# Patient Record
Sex: MALE | Race: WHITE | NOT HISPANIC OR LATINO | Employment: FULL TIME | URBAN - METROPOLITAN AREA
[De-identification: names, ages, dates, MRNs, and addresses within clinical notes are randomized per-mention and may not be internally consistent; named-entity substitution may affect disease eponyms.]

---

## 2017-04-13 ENCOUNTER — GENERIC CONVERSION - ENCOUNTER (OUTPATIENT)
Dept: OTHER | Facility: OTHER | Age: 62
End: 2017-04-13

## 2017-04-13 ENCOUNTER — ALLSCRIPTS OFFICE VISIT (OUTPATIENT)
Dept: OTHER | Facility: OTHER | Age: 62
End: 2017-04-13

## 2017-04-14 LAB
A/G RATIO (HISTORICAL): 1.9 (ref 1.2–2.2)
ALBUMIN SERPL BCP-MCNC: 4.6 G/DL (ref 3.6–4.8)
ALP SERPL-CCNC: 116 IU/L (ref 39–117)
ALT SERPL W P-5'-P-CCNC: 56 IU/L (ref 0–44)
AMYLASE (HISTORICAL): 96 U/L (ref 31–124)
AST SERPL W P-5'-P-CCNC: 41 IU/L (ref 0–40)
BACTERIA UR QL AUTO: NORMAL
BASOPHILS # BLD AUTO: 0 %
BASOPHILS # BLD AUTO: 0 X10E3/UL (ref 0–0.2)
BILIRUB SERPL-MCNC: 0.6 MG/DL (ref 0–1.2)
BILIRUB UR QL STRIP: NEGATIVE
BUN SERPL-MCNC: 16 MG/DL (ref 8–27)
BUN/CREA RATIO (HISTORICAL): 22 (ref 10–24)
CALCIUM SERPL-MCNC: 9.8 MG/DL (ref 8.6–10.2)
CHLORIDE SERPL-SCNC: 99 MMOL/L (ref 96–106)
CO2 SERPL-SCNC: 24 MMOL/L (ref 18–29)
COLOR UR: YELLOW
COMMENT (HISTORICAL): CLEAR
CREAT SERPL-MCNC: 0.72 MG/DL (ref 0.76–1.27)
DEPRECATED RDW RBC AUTO: 13.1 % (ref 12.3–15.4)
EGFR AFRICAN AMERICAN (HISTORICAL): 116 ML/MIN/1.73
EGFR-AMERICAN CALC (HISTORICAL): 101 ML/MIN/1.73
EOSINOPHIL # BLD AUTO: 0.1 X10E3/UL (ref 0–0.4)
EOSINOPHIL # BLD AUTO: 2 %
ERYTHROCYTE SEDIMENTATION RATE (HISTORICAL): 35 MM/HR (ref 0–30)
FECAL OCCULT BLOOD DIAGNOSTIC (HISTORICAL): NEGATIVE
GGT (HISTORICAL): 118 IU/L (ref 0–65)
GLUCOSE (HISTORICAL): NEGATIVE
GLUCOSE SERPL-MCNC: 98 MG/DL (ref 65–99)
HCT VFR BLD AUTO: 37.7 % (ref 37.5–51)
HGB BLD-MCNC: 12.9 G/DL (ref 12.6–17.7)
IMM.GRANULOCYTES (CD4/8) (HISTORICAL): 0 %
IMM.GRANULOCYTES (CD4/8) (HISTORICAL): 0 X10E3/UL (ref 0–0.1)
KETONES UR STRIP-MCNC: NEGATIVE MG/DL
LEUKOCYTE ESTERASE UR QL STRIP: NEGATIVE
LIPASE SERPL-CCNC: 55 U/L (ref 0–59)
LYME IGG/IGM AB (HISTORICAL): <0.91 ISR (ref 0–0.9)
LYME IGM (HISTORICAL): <0.8 INDEX (ref 0–0.79)
LYMPHOCYTES # BLD AUTO: 2.5 X10E3/UL (ref 0.7–3.1)
LYMPHOCYTES # BLD AUTO: 41 %
MCH RBC QN AUTO: 31.6 PG (ref 26.6–33)
MCHC RBC AUTO-ENTMCNC: 34.2 G/DL (ref 31.5–35.7)
MCV RBC AUTO: 92 FL (ref 79–97)
MICROSCOPIC EXAMINATION (HISTORICAL): NORMAL
MICROSCOPIC EXAMINATION (HISTORICAL): NORMAL
MONOCYTES # BLD AUTO: 0.4 X10E3/UL (ref 0.1–0.9)
MONOCYTES (HISTORICAL): 7 %
MUCUS THREADS (HISTORICAL): PRESENT
NEUTROPHILS # BLD AUTO: 3 X10E3/UL (ref 1.4–7)
NEUTROPHILS # BLD AUTO: 50 %
NITRITE UR QL STRIP: NEGATIVE
NON-SQ EPI CELLS URNS QL MICRO: NORMAL /HPF
PH UR STRIP.AUTO: 6.5 [PH] (ref 5–7.5)
PLATELET # BLD AUTO: 157 X10E3/UL (ref 150–379)
POTASSIUM SERPL-SCNC: 4.4 MMOL/L (ref 3.5–5.2)
PROT UR STRIP-MCNC: NORMAL MG/DL
RBC (HISTORICAL): 4.08 X10E6/UL (ref 4.14–5.8)
RBC (HISTORICAL): NORMAL /HPF
SODIUM SERPL-SCNC: 140 MMOL/L (ref 134–144)
SP GR UR STRIP.AUTO: 1.02 (ref 1–1.03)
TOT. GLOBULIN, SERUM (HISTORICAL): 2.4 G/DL (ref 1.5–4.5)
TOTAL PROTEIN (HISTORICAL): 7 G/DL (ref 6–8.5)
URINALYSIS (UA) (HISTORICAL): NORMAL
UROBILINOGEN UR QL STRIP.AUTO: 1 EU/DL (ref 0.2–1)
WBC # BLD AUTO: 6.1 X10E3/UL (ref 3.4–10.8)
WBC # BLD AUTO: NORMAL /HPF

## 2017-04-17 ENCOUNTER — GENERIC CONVERSION - ENCOUNTER (OUTPATIENT)
Dept: OTHER | Facility: OTHER | Age: 62
End: 2017-04-17

## 2017-04-27 ENCOUNTER — TRANSCRIBE ORDERS (OUTPATIENT)
Dept: ADMINISTRATIVE | Facility: HOSPITAL | Age: 62
End: 2017-04-27

## 2017-04-27 ENCOUNTER — GENERIC CONVERSION - ENCOUNTER (OUTPATIENT)
Dept: OTHER | Facility: OTHER | Age: 62
End: 2017-04-27

## 2017-04-27 DIAGNOSIS — R52 PAIN: Primary | ICD-10-CM

## 2017-04-28 ENCOUNTER — HOSPITAL ENCOUNTER (OUTPATIENT)
Dept: RADIOLOGY | Age: 62
Discharge: HOME | End: 2017-04-28
Payer: COMMERCIAL

## 2017-04-28 DIAGNOSIS — R52 PAIN: ICD-10-CM

## 2017-04-28 PROCEDURE — 76700 US EXAM ABDOM COMPLETE: CPT

## 2018-01-12 NOTE — MISCELLANEOUS
Message   Recorded as Task   Date: 12/21/2016 09:08 AM, Created By: Heber Conrad   Task Name: Call Back   Assigned To: Farida Scott   Regarding Patient: Alona Muñiz, Status: In Progress   Comment:    Heber Conrad - 21 Dec 2016 9:08 AM     TASK CREATED  His xray is normal   please notify   Farida Scott - 21 Dec 2016 1:27 PM     TASK EDITED  Left message to call office  trb   Farida Scott - 21 Dec 2016 1:27 PM     TASK IN PROGRESS   12/21/2016 Patient notified  trb      Active Problems    1  Acute bronchitis (466 0) (J20 9)   2  Chest pain (786 50) (R07 9)   3  Colonoscopy (Fiberoptic) Screening   4  Encounter for screening colonoscopy (V76 51) (Z12 11)   5  Encounter for screening for cardiovascular disorders (V81 2) (Z13 6)   6  Finger fracture (816 00) (S62 609A)   7  Hand injury (959 4) (S69 90XA)   8  Imaging Studies Nonspecific Abnormal Findings Intrathoracic (793 2)   9  Neck pain (723 1) (M54 2)   10  Rib pain (786 50) (R07 81)   11  Rotator Cuff Tendon Tear (726 19)   12  Shoulder Injury (959 2)   13  Special screening examination for neoplasm of prostate (V76 44) (Z12 5)   14  Work-related Environmental Exposure (V62 1)    Current Meds   1  No Reported Medications Recorded    Allergies    1  Codeine Derivatives   2  Morphine Derivatives   3  Penicillins    4  Nuts    Signatures   Electronically signed by :  Ramandeep Sheets, ; Dec 21 2016  5:26PM EST                       (Author)

## 2018-01-12 NOTE — MISCELLANEOUS
Message  patient notified of labs   going to see GI      Signatures   Electronically signed by : Niki Sanchez DO;  Apr 17 2017 10:22AM EST                       (Author)

## 2018-01-13 VITALS
BODY MASS INDEX: 31.83 KG/M2 | SYSTOLIC BLOOD PRESSURE: 150 MMHG | HEART RATE: 73 BPM | HEIGHT: 72 IN | RESPIRATION RATE: 10 BRPM | TEMPERATURE: 97.9 F | WEIGHT: 235 LBS | DIASTOLIC BLOOD PRESSURE: 90 MMHG

## 2018-01-17 ENCOUNTER — ALLSCRIPTS OFFICE VISIT (OUTPATIENT)
Dept: OTHER | Facility: OTHER | Age: 63
End: 2018-01-17

## 2018-01-18 NOTE — PROGRESS NOTES
Assessment   1  Acute sinusitis (461 9) (J01 90)    Plan   Acute sinusitis    · Clarithromycin 500 MG Oral Tablet; TAKE 1 TABLET EVERY 12 HOURS DAILY   · PredniSONE 10 MG Oral Tablet; 6 tabs on day 1, 5 tabs on day 2, 4 tabs on day    3, 3 tabs on day 4    2 tabs on day 5, 1 tab on day 6    Discussion/Summary      Biaxin and prednisone as ordered as needed  Chief Complaint   Fatigue, cough with yellow green mucous, and head congested for 3 weeks      History of Present Illness   HPI: here for 3 weeks of sinus congestion  cough worse  fevers and chills weeks ago that resolved been working in dirty environments      Review of Systems        Constitutional: no fever or chills, feels well, no tiredness, no recent weight loss or weight gain  ENT: sore throat-- and-- nasal discharge, but-- as noted in HPI  Cardiovascular: no complaints of slow or fast heart rate, no chest pain, no palpitations, no leg claudication or lower extremity edema  Respiratory: cough, but-- as noted in HPI  Gastrointestinal: no complaints of abdominal pain, no constipation, no nausea or vomiting, no diarrhea or bloody stools  Genitourinary: no complaints of dysuria or incontinence, no hesitancy, no nocturia, no genital lesion, no inadequacy of penile erection  Musculoskeletal: no complaints of arthralgia, no myalgia, no joint swelling or stiffness, no limb pain or swelling  Integumentary: no complaints of skin rash or lesion, no itching or dry skin, no skin wounds  Neurological: no complaints of headache, no confusion, no numbness or tingling, no dizziness or fainting  Active Problems   1  Chest pain (786 50) (R07 9)   2  Imaging Studies Nonspecific Abnormal Findings Intrathoracic (793 2)   3  Neck pain (723 1) (M54 2)   4  Rib pain (786 50) (R07 81)   5  Rotator Cuff Tendon Tear (726 19)   6  Splenomegaly (789 2) (R16 1)   7  Work-related Environmental Exposure (V62 1)    Past Medical History   1  History of Encounter for screening for cardiovascular disorders (V81 2) (Z13 6)   2  History of Hand injury (959 4) (S69 90XA)   3  History of abdominal pain (V13 89) (Z87 898)   4  History of acute bronchitis (V12 69) (Z87 09)   5  History of fracture of phalanx of finger (V15 51) (Z87 81)   6  History of vomiting (V13 89) (Z87 898)   7  History of Pneumonia (V12 61)   8  History of Shoulder Injury (959 2)  Active Problems And Past Medical History Reviewed: The active problems and past medical history were reviewed and updated today  Family History   Mother    1  Family history of Carcinoma Of The Pancreas  Father    2  Family history of Pacemaker Placement  Family History Reviewed: The family history was reviewed and updated today  Social History    · Former smoker (W06 06) (F48 533)  The social history was reviewed and updated today  The social history was reviewed and is unchanged  Surgical History   Surgical History Reviewed: The surgical history was reviewed and updated today  Current Meds    1  No Reported Medications Recorded     The medication list was reviewed and updated today  Allergies   1  Codeine Derivatives   2  Morphine Derivatives   3  Penicillins  4  Nuts    Vitals    Recorded: 05XXC5810 09:45AM   Temperature 97 5 F, Tympanic   Heart Rate 76   Systolic 387, RUE, Sitting   Diastolic 067, RUE, Sitting   Height 6 ft    Weight 209 lb    BMI Calculated 28 35   BSA Calculated 2 17     Physical Exam        Constitutional      General appearance: No acute distress, well appearing and well nourished  Ears, Nose, Mouth, and Throat      Otoscopic examination: Abnormal  -- bulging tm's  Nasal mucosa, septum, and turbinates: Abnormal  -- +Swollen bloody turbinates  Oropharynx: Abnormal  -- +PND  Pulmonary      Respiratory effort: No increased work of breathing or signs of respiratory distress         Auscultation of lungs: Clear to auscultation, equal breath sounds bilaterally, no wheezes, no rales, no rhonci  Cardiovascular      Auscultation of heart: Normal rate and rhythm, normal S1 and S2, without murmurs  Carotid pulses: Normal        Abdomen      Abdomen: Non-tender, no masses  Liver and spleen: No hepatomegaly or splenomegaly  Lymphatic      Palpation of lymph nodes in neck: No lymphadenopathy  Musculoskeletal      Gait and station: Normal        Digits and nails: Normal without clubbing or cyanosis  Inspection/palpation of joints, bones, and muscles: Normal        Skin      Skin and subcutaneous tissue: Normal without rashes or lesions  Neurologic      Cranial nerves: Cranial nerves 2-12 intact  Sensation: No sensory loss         Psychiatric      Orientation to person, place and time: Normal        Mood and affect: Normal           Signatures    Electronically signed by : Gregoria Locke DO; Jan 17 2018 10:27AM EST                       (Author)

## 2018-01-23 VITALS
SYSTOLIC BLOOD PRESSURE: 150 MMHG | HEART RATE: 76 BPM | TEMPERATURE: 97.5 F | WEIGHT: 209 LBS | HEIGHT: 72 IN | BODY MASS INDEX: 28.31 KG/M2 | DIASTOLIC BLOOD PRESSURE: 100 MMHG

## 2018-01-23 NOTE — MISCELLANEOUS
Message  Return to work or school:   Chip Parra is under my professional care   He was seen in my office on 01/17/2018   He is able to return to work on  01/22/2018            Signatures   Electronically signed by : Radha Sanchez DO; Jan 17 2018 10:34AM EST                       (Author)

## 2018-02-23 ENCOUNTER — HOSPITAL ENCOUNTER (OUTPATIENT)
Dept: ULTRASOUND IMAGING | Facility: HOSPITAL | Age: 63
Discharge: HOME | End: 2018-02-23
Payer: OTHER MISCELLANEOUS

## 2018-02-23 ENCOUNTER — OFFICE VISIT (OUTPATIENT)
Dept: FAMILY MEDICINE CLINIC | Facility: CLINIC | Age: 63
End: 2018-02-23
Payer: COMMERCIAL

## 2018-02-23 VITALS
DIASTOLIC BLOOD PRESSURE: 68 MMHG | HEART RATE: 70 BPM | SYSTOLIC BLOOD PRESSURE: 120 MMHG | TEMPERATURE: 97.8 F | BODY MASS INDEX: 30.2 KG/M2 | RESPIRATION RATE: 14 BRPM | HEIGHT: 72 IN | WEIGHT: 223 LBS

## 2018-02-23 DIAGNOSIS — N50.82 SCROTUM PAIN: Primary | ICD-10-CM

## 2018-02-23 DIAGNOSIS — N50.82 SCROTUM PAIN: ICD-10-CM

## 2018-02-23 PROCEDURE — 76870 US EXAM SCROTUM: CPT

## 2018-02-23 PROCEDURE — 99213 OFFICE O/P EST LOW 20 MIN: CPT | Performed by: FAMILY MEDICINE

## 2018-02-23 NOTE — PROGRESS NOTES
Assessment/Plan:         Diagnoses and all orders for this visit:    Scrotum pain  -     US scrotum and testicles --stat  Will go at 2:15 pm today      will f/u after u/s        Subjective:     Chief Complaint   Patient presents with    Work Related Injury     injury occurred at work on Wednesday 2/21/18         Patient ID: Katrina Barnes is a 58 y o  male  Was pushing a skid and felt pain down legs   now scrotum is swollen and huge  And painful        The following portions of the patient's history were reviewed and updated as appropriate: allergies, current medications, past family history, past medical history, past social history, past surgical history and problem list     Review of Systems   Constitutional: Negative  HENT: Negative  Eyes: Negative  Respiratory: Negative  Cardiovascular: Negative  Gastrointestinal: Negative  Endocrine: Negative  Genitourinary: Negative  Musculoskeletal: Negative  Skin: Negative  Allergic/Immunologic: Negative  Neurological: Negative  Hematological: Negative  Psychiatric/Behavioral: Negative  All other systems reviewed and are negative  Objective:    Vitals:    02/23/18 1219   BP: 120/68   BP Location: Left arm   Patient Position: Sitting   Cuff Size: Standard   Pulse: 70   Resp: 14   Temp: 97 8 °F (36 6 °C)   TempSrc: Tympanic   Weight: 101 kg (223 lb)   Height: 6' (1 829 m)          Physical Exam   Constitutional: He is oriented to person, place, and time  He appears well-developed and well-nourished  HENT:   Head: Normocephalic  Right Ear: External ear normal    Left Ear: External ear normal    Nose: Nose normal    Mouth/Throat: Oropharynx is clear and moist    Eyes: EOM are normal  Pupils are equal, round, and reactive to light  Neck: Normal range of motion  Cardiovascular: Normal rate, regular rhythm and normal heart sounds  Pulmonary/Chest: Effort normal and breath sounds normal    Abdominal: Soft   Bowel sounds are normal  He exhibits no distension  There is no tenderness  Genitourinary:   Genitourinary Comments: Pain and swelling in scrotum b/l     Musculoskeletal: Normal range of motion  Neurological: He is alert and oriented to person, place, and time  No cranial nerve deficit  Skin: Skin is warm and dry  No rash noted  Psychiatric: He has a normal mood and affect  His behavior is normal  Judgment and thought content normal    Nursing note and vitals reviewed

## 2018-03-16 ENCOUNTER — HOSPITAL ENCOUNTER (OUTPATIENT)
Dept: CT IMAGING | Facility: HOSPITAL | Age: 63
Discharge: HOME | End: 2018-03-16
Payer: COMMERCIAL

## 2018-03-16 ENCOUNTER — OFFICE VISIT (OUTPATIENT)
Dept: FAMILY MEDICINE CLINIC | Facility: CLINIC | Age: 63
End: 2018-03-16
Payer: COMMERCIAL

## 2018-03-16 ENCOUNTER — APPOINTMENT (OUTPATIENT)
Dept: LAB | Facility: HOSPITAL | Age: 63
End: 2018-03-16
Payer: OTHER MISCELLANEOUS

## 2018-03-16 VITALS
SYSTOLIC BLOOD PRESSURE: 118 MMHG | RESPIRATION RATE: 14 BRPM | HEART RATE: 65 BPM | DIASTOLIC BLOOD PRESSURE: 80 MMHG | TEMPERATURE: 97.8 F | BODY MASS INDEX: 29.8 KG/M2 | HEIGHT: 72 IN | WEIGHT: 220 LBS

## 2018-03-16 DIAGNOSIS — S39.91XD INJURY OF GROIN, SUBSEQUENT ENCOUNTER: ICD-10-CM

## 2018-03-16 DIAGNOSIS — R60.9 EDEMA, UNSPECIFIED TYPE: ICD-10-CM

## 2018-03-16 DIAGNOSIS — C22.1 CHOLANGIOCARCINOMA METASTATIC TO LIVER (HCC): ICD-10-CM

## 2018-03-16 DIAGNOSIS — R17 JAUNDICE: ICD-10-CM

## 2018-03-16 DIAGNOSIS — S39.91XD INJURY OF GROIN, SUBSEQUENT ENCOUNTER: Primary | ICD-10-CM

## 2018-03-16 DIAGNOSIS — C78.7 CHOLANGIOCARCINOMA METASTATIC TO LIVER (HCC): ICD-10-CM

## 2018-03-16 LAB
ALBUMIN SERPL BCP-MCNC: 3.3 G/DL (ref 3.5–5)
ALP SERPL-CCNC: 703 U/L (ref 46–116)
ALT SERPL W P-5'-P-CCNC: 85 U/L (ref 12–78)
ANION GAP SERPL CALCULATED.3IONS-SCNC: 8 MMOL/L (ref 4–13)
AST SERPL W P-5'-P-CCNC: 199 U/L (ref 5–45)
BILIRUB SERPL-MCNC: 2.7 MG/DL (ref 0.2–1)
BUN SERPL-MCNC: 19 MG/DL (ref 5–25)
CALCIUM SERPL-MCNC: 8.7 MG/DL (ref 8.3–10.1)
CHLORIDE SERPL-SCNC: 102 MMOL/L (ref 100–108)
CO2 SERPL-SCNC: 28 MMOL/L (ref 21–32)
CREAT SERPL-MCNC: 0.78 MG/DL (ref 0.6–1.3)
GFR SERPL CREATININE-BSD FRML MDRD: 97 ML/MIN/1.73SQ M
GLUCOSE P FAST SERPL-MCNC: 101 MG/DL (ref 65–99)
HM COLONOSCOPY: NORMAL
POTASSIUM SERPL-SCNC: 3.8 MMOL/L (ref 3.5–5.3)
PROT SERPL-MCNC: 7 G/DL (ref 6.4–8.2)
SODIUM SERPL-SCNC: 138 MMOL/L (ref 136–145)

## 2018-03-16 PROCEDURE — 80053 COMPREHEN METABOLIC PANEL: CPT

## 2018-03-16 PROCEDURE — 74177 CT ABD & PELVIS W/CONTRAST: CPT

## 2018-03-16 PROCEDURE — 99213 OFFICE O/P EST LOW 20 MIN: CPT | Performed by: FAMILY MEDICINE

## 2018-03-16 PROCEDURE — 36415 COLL VENOUS BLD VENIPUNCTURE: CPT

## 2018-03-16 RX ADMIN — IOHEXOL 100 ML: 350 INJECTION, SOLUTION INTRAVENOUS at 11:56

## 2018-03-16 NOTE — PROGRESS NOTES
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)    Jaundice  -     Comprehensive metabolic panel; Future  -     CT abdomen pelvis w contrast; Future    Edema, unspecified type  -     CT abdomen pelvis w contrast; Future        Upon initial re-evaluation for groin injury from 3 weeks ago-- patient was found to have significant edema that is not explained by his injury  On further evaluation, he was found to have ascites and jaundice  He was sent for stat ct scan and found to have extensive cholangiocarcinoma   with mets and thrombus thoughout his abdomen  He returned after his ct scan and we discussed the results  At length  He does not currently have active insurance  He will need to se oncology as soon as possible  And will need to get medical assistence   He was instructed to how to go about this  He is instructed to call our office for any help or guidance he may need  Subjective:     Chief Complaint   Patient presents with    Leg Pain     leg and feet swelling and pain begining 3 weeks ago  Patient ID: Mohamud Monreal is a 58 y o  male  Here for f/u of groin injury  Pain has not gotten better  Also now legs have started swelling  The following portions of the patient's history were reviewed and updated as appropriate: allergies, current medications, past family history, past medical history, past social history, past surgical history and problem list     Review of Systems   Constitutional: Negative  HENT: Negative  Eyes: Negative  Respiratory: Negative  Cardiovascular: Negative  Gastrointestinal: Positive for abdominal pain  Endocrine: Negative  Genitourinary:        Groin pain   Musculoskeletal: Negative  Skin: Negative  Allergic/Immunologic: Negative  Neurological: Negative  Hematological: Negative  Psychiatric/Behavioral: Negative  All other systems reviewed and are negative  Objective:    Vitals:    03/16/18 0846   BP: 118/80   BP Location: Left arm   Patient Position: Sitting   Cuff Size: Large   Pulse: 65   Resp: 14   Temp: 97 8 °F (36 6 °C)   TempSrc: Tympanic   Weight: 99 8 kg (220 lb)   Height: 6' (1 829 m)          Physical Exam   Constitutional: He is oriented to person, place, and time  He appears well-developed and well-nourished  No distress  HENT:   Head: Normocephalic  Right Ear: External ear normal    Left Ear: External ear normal    Nose: Nose normal    Mouth/Throat: Oropharynx is clear and moist    Eyes: Conjunctivae and EOM are normal  Pupils are equal, round, and reactive to light  Right eye exhibits no discharge  Left eye exhibits no discharge  Neck: Normal range of motion  Cardiovascular: Normal rate, regular rhythm and normal heart sounds  Pulmonary/Chest: Effort normal and breath sounds normal    Abdominal: Soft  Bowel sounds are normal  He exhibits distension  There is no tenderness  Ascites     Musculoskeletal: Normal range of motion  He exhibits edema  B/l + 4 edema     Neurological: He is alert and oriented to person, place, and time  He displays abnormal reflex  No cranial nerve deficit  Skin: Skin is warm and dry  No rash noted  +jaundice   Psychiatric: He has a normal mood and affect  His behavior is normal  Judgment and thought content normal    Nursing note and vitals reviewed

## 2018-03-17 ENCOUNTER — TELEPHONE (OUTPATIENT)
Dept: FAMILY MEDICINE CLINIC | Facility: CLINIC | Age: 63
End: 2018-03-17

## 2018-03-19 DIAGNOSIS — C22.1 CHOLANGIOCARCINOMA METASTATIC TO LIVER (HCC): Primary | ICD-10-CM

## 2018-03-19 DIAGNOSIS — C78.7 CHOLANGIOCARCINOMA METASTATIC TO LIVER (HCC): Primary | ICD-10-CM

## 2018-03-29 ENCOUNTER — TRANSCRIBE ORDERS (OUTPATIENT)
Dept: ADMINISTRATIVE | Facility: HOSPITAL | Age: 63
End: 2018-03-29

## 2018-03-29 DIAGNOSIS — R16.0 HEPATOMEGALY: Primary | ICD-10-CM

## 2018-04-03 ENCOUNTER — HOSPITAL ENCOUNTER (OUTPATIENT)
Dept: RADIOLOGY | Age: 63
Discharge: HOME | End: 2018-04-03
Payer: COMMERCIAL

## 2018-04-03 VITALS — WEIGHT: 229 LBS | BODY MASS INDEX: 31.06 KG/M2

## 2018-04-03 DIAGNOSIS — R16.0 LIVER MASSES: ICD-10-CM

## 2018-04-03 LAB — GLUCOSE SERPL-MCNC: 89 MG/DL (ref 65–140)

## 2018-04-03 PROCEDURE — A9552 F18 FDG: HCPCS

## 2018-04-03 PROCEDURE — 82948 REAGENT STRIP/BLOOD GLUCOSE: CPT

## 2018-04-03 PROCEDURE — 78815 PET IMAGE W/CT SKULL-THIGH: CPT

## 2018-04-03 RX ADMIN — IOHEXOL 5 ML: 240 INJECTION, SOLUTION INTRATHECAL; INTRAVASCULAR; INTRAVENOUS; ORAL at 11:54

## 2018-05-04 ENCOUNTER — HOSPITAL ENCOUNTER (EMERGENCY)
Facility: HOSPITAL | Age: 63
Discharge: HOME | End: 2018-05-04
Attending: EMERGENCY MEDICINE | Admitting: EMERGENCY MEDICINE
Payer: COMMERCIAL

## 2018-05-04 VITALS
OXYGEN SATURATION: 98 % | HEIGHT: 72 IN | SYSTOLIC BLOOD PRESSURE: 117 MMHG | WEIGHT: 229.28 LBS | HEART RATE: 98 BPM | RESPIRATION RATE: 17 BRPM | BODY MASS INDEX: 31.05 KG/M2 | TEMPERATURE: 97.5 F | DIASTOLIC BLOOD PRESSURE: 62 MMHG

## 2018-05-04 DIAGNOSIS — R18.8 ASCITES: Primary | ICD-10-CM

## 2018-05-04 PROCEDURE — 99283 EMERGENCY DEPT VISIT LOW MDM: CPT

## 2018-05-04 NOTE — ED PROVIDER NOTES
History  Chief Complaint   Patient presents with    Abdominal Problem     patient presents to ED  States he has bile duct CA and had some fluid drained from his abdomen last week  Yesterday recieved a double dose of chemo and now the puncture wound is leaking fluid  Spoke to their doctor at [facility] and was told to come here to get the puncture wound glued     Patient has bile duct cancer, had paracentesis 1 week ago, then had IVF yesterday with chemo  Today, woke up with saturated clear fluid at paracentesis site, no fevers, no abd pain  Dr Sonu Peralta referred him to have dermabond placed at puncture site  None       History reviewed  No pertinent past medical history  Past Surgical History:   Procedure Laterality Date    CHOLECYSTECTOMY      JOINT REPLACEMENT         History reviewed  No pertinent family history  I have reviewed and agree with the history as documented  Social History   Substance Use Topics    Smoking status: Former Smoker     Types: Cigarettes    Smokeless tobacco: Never Used      Comment: quit over 30 years ago    Alcohol use Yes      Comment: socially        Review of Systems   All other systems reviewed and are negative  Physical Exam  ED Triage Vitals [05/04/18 1344]   Temperature Pulse Respirations Blood Pressure SpO2   97 5 °F (36 4 °C) 98 17 117/62 98 %      Temp Source Heart Rate Source Patient Position - Orthostatic VS BP Location FiO2 (%)   Temporal Monitor Sitting Right arm --      Pain Score       No Pain           Orthostatic Vital Signs  Vitals:    05/04/18 1344   BP: 117/62   Pulse: 98   Patient Position - Orthostatic VS: Sitting       Physical Exam   Constitutional: He appears cachectic  He has a sickly appearance  HENT:   Mouth/Throat: Oropharynx is clear and moist    Eyes: Conjunctivae and EOM are normal  Pupils are equal, round, and reactive to light  Neck: Normal range of motion  Neck supple  No spinous process tenderness present  Cardiovascular: Normal rate, regular rhythm, normal heart sounds and intact distal pulses  Pulmonary/Chest: Effort normal and breath sounds normal  No respiratory distress  He has no wheezes  Abdominal: Soft  Bowel sounds are normal  He exhibits distension and ascites  There is no tenderness  Musculoskeletal: Normal range of motion  He exhibits edema  Neurological: He is alert  He has normal strength  No sensory deficit  GCS eye subscore is 4  GCS verbal subscore is 5  GCS motor subscore is 6  Skin: Skin is warm and dry  No rash noted  Psychiatric: He has a normal mood and affect  Nursing note and vitals reviewed  ED Medications  Medications - No data to display    Diagnostic Studies  Results Reviewed     None                 No orders to display              Procedures  General Procedure  Date/Time: 5/4/2018 2:05 PM  Performed by: Jose Canas by: Precious Padilla     Patient location:  ED  Consent:     Consent obtained:  Verbal    Consent given by:  Patient    Risks discussed:  Infection    Alternatives discussed:  No treatment  Pre-procedure details:     Procedure prep: alcohol  Anesthesia (see MAR for exact dosages): Anesthesia method:  None  Procedure Detail:     Equipment used:  Dermabond and 4 by 4 gauze    Procedure note (site, laterality, method, findings):  Patient on left lateral decubitus  Post-procedure details:     Patient tolerance of procedure: Tolerated well, no immediate complications           Phone Contacts  ED Phone Contact    ED Course  ED Course as of May 04 2006   Fri May 04, 2018   1355 Left message with Dr Maye Cisneros to call back      Dr Maye Cisneros recommends apply dermabond until patient gets his next paracentesis                            MDM  Number of Diagnoses or Management Options  Ascites: established and worsening     Amount and/or Complexity of Data Reviewed  Discuss the patient with other providers: yes    Patient Progress  Patient progress: improved    CritCare Time    Disposition  Final diagnoses:   Ascites - drainage at paracentesis site, acute     Time reflects when diagnosis was documented in both MDM as applicable and the Disposition within this note     Time User Action Codes Description Comment    5/4/2018  2:54 PM Suellen Maldonado Add [R18 8] Ascites     5/4/2018  2:54 PM Suellen Maldonado Modify [R18 8] Ascites drainage at paracentesis site, acute      ED Disposition     ED Disposition Condition Comment    Discharge  Theador Mis discharge to home/self care  Condition at discharge: Good        Follow-up Information     Follow up With Specialties Details Why Laya Mims MD  In 3 days  [facility] [id number]  [phone]          There are no discharge medications for this patient  No discharge procedures on file      ED Provider  Electronically Signed by           Claudia Sky DO  05/04/18 2007

## 2018-05-04 NOTE — DISCHARGE INSTRUCTIONS
Ascites   WHAT YOU NEED TO KNOW:   What is ascites? Ascites is excess fluid in your lower abdomen  The fluid causes swelling  Ascites can signal a more serious problem in your body  What causes ascites? · Liver disease, such as cirrhosis or hepatitis     · Cancer     · Congestive heart failure     · Blood clots in the veins that enter and leave the liver  What are the signs and symptoms of ascites? · Rapid weight gain and swelling     · Swollen abdomen     · Shortness of breath     · Stretch marks and bulging veins on the abdomen     · Nausea     · A feeling of fullness after eating little food  How is ascites diagnosed? Your healthcare provider will ask you to lie on your back while he taps or presses on your abdomen  He will ask about your symptoms and when they started  He will ask about alcohol or IV (intravenous) drug use, sexual activity, and any blood transfusions you have had  Tell him if you have had heart problems or cancer  · Blood and urine tests:  Healthcare providers will test samples of your blood and urine to see how your liver and kidneys are working  · Abdominal ultrasound:  Gel helps your healthcare provider move a sensor over your abdomen during an ultrasound  The sensor uses sound waves to show pictures of your organs on a monitor  · CT scan:  A CT scan uses a computer to take x-rays of the organs and blood vessels in your abdomen  You may be given dye called contrast to help the pictures show up better  Tell the healthcare provider if you are allergic to iodine or seafood  You may also be allergic to the dye  · Ascites fluid test:  Healthcare providers use a needle to take a sample of fluid from your abdomen for testing  This procedure is called a fluid tap or paracentesis  Numbing medicine makes you more comfortable during this test  Tests on the fluid help find the cause of your ascites and screen for infection       · 24-hour urine collection:  You will use a container to hold all of your urine collected over 24 hours  The urine must be kept cold until it is tested  · Other tests:  Tests such as endoscopy or laparoscopy use a scope (tube) with a camera on its tip to see your lower esophagus or liver  Anesthesia medicine will keep you numb or asleep during the procedure  Healthcare providers will look for bleeding or blood clots  A tissue sample (biopsy) may be taken  How is ascites treated? Ascites treatment usually combines medicines with changes to your eating and drinking habits  Other treatments are used if your ascites does not improve or if the condition that caused the ascites is getting worse  · Medicines:      ¨ Diuretics:  Diuretics help decrease the fluid in your abdomen by causing you to urinate more often  You will lose weight as the fluid decreases  Ask your healthcare provider how much weight you should expect to lose each day  ¨ Antibiotics: These medicines are used to prevent or fight infections caused by bacteria  ¨ Vaccines or antiviral medicines:  When ascites is caused by hepatitis (a virus that attacks your liver), antiviral medicines may help prevent more damage to your liver  Healthcare providers may give you vaccines to prevent hepatitis  · Sodium and liquid restriction:      ¨ Sodium:  You must lower the amount of sodium (salt) you eat and drink to reduce the fluid in your abdomen  Sodium is in many foods, even when you cannot taste it  You may need to be on a 2-gram sodium diet, which limits your sodium to less than half a teaspoon each day  Ask your healthcare provider for more information about low-sodium diets  ¨ Liquid:  Healthcare providers may ask you to limit your liquids to about 34 ounces (about 2 pints) a day if you are not losing fluid with a low-sodium diet  · Procedures and surgeries:      ¨ Paracentesis:  Your healthcare provider drains the fluid out of your abdomen through a needle   You will receive numbing medicine before the procedure  Paracentesis can quickly remove quarts of fluid  Paracentesis may be repeated if the ascites does not respond to other treatments  ¨ Transjugular intrahepatic portosystemic shunt: This procedure is also called TIPS  TIPS can treat large ascites when you cannot have paracentesis  Your healthcare provider uses a catheter (plastic tube) to increase blood flow through your liver  This helps to reduce the fluid in your abdomen  ¨ Liver transplant:  Sometimes your liver is so damaged that a liver transplant is the only long-term treatment option  ¨ Peritoneovenous shunt: This procedure drains the fluid into a large vein to be absorbed by the body  The shunt is a tube placed in your abdomen and connected to the vein  Healthcare providers may use this procedure if you cannot have a liver transplant or other ascites treatments  What are the risks of ascites? · The excess fluid may affect your ability to breathe  Your swollen abdomen can make it hard to eat  Hernias (weak areas in the muscles on your abdomen) may form from the pressure of the fluid  Treatment can change your electrolyte (body chemical) balance  Electrolyte changes can cause confusion, drowsiness, and thinking and movement problems that may lead to coma  You may get an infection called spontaneous bacterial peritonitis in your abdomen that can be life-threatening  You will need more medicines or treatments if you develop these problems  · Even with treatment, the condition that caused your ascites may become life-threatening  Bleeding in the esophagus or liver or kidney failure can occur  How do I manage ascites? · Do not drink alcohol:  Alcohol worsens the damage to your liver  Your ascites may improve or even go away after you stop drinking  Do not take medicines that contain alcohol  Ask your healthcare provider for information if you need help to quit drinking alcohol       · Follow your low-sodium plan:  A dietitian can help you create a low-sodium diet  He may suggest lemon juice or herbs to flavor your food  Avoid salted butter or margarine, milk, cheese, and canned or frozen foods that are not made for low-salt diets  Ask your healthcare provider or dietitian before you use salt substitutes  · Write down your daily weight: You will need to track your weight at home so healthcare providers can see if treatment is working  Weigh yourself each day  Ask your healthcare provider how much weight you should be losing  Your healthcare provider will want to know if you are losing too much or too little weight  · Ask about NSAIDs:  NSAIDs are over-the-counter pain and fever medicines  You may not urinate enough to take NSAIDs safely  Ask your healthcare provider if NSAIDs are safe for you  Follow directions carefully  These medicines can cause stomach bleeding or kidney problems if they are not taken correctly  · Limit activity: Too much physical activity may make your ascites worse  Ask your healthcare provider if you have any limits to your normal daily activities  Rarely, bedrest is needed if other health problems develop with ascites  Where can I find more information? · [facility]  [address] [facility] , [address]  Phone: [phone]  Web Address: cheerapp  When should I contact my healthcare provider? Contact your healthcare provider if:  · You are losing more or less weight than expected  · You are urinating less than usual      · You feel dizzy or lightheaded  · You develop tiredness, dry mouth, nausea, or vomiting  · You have muscle cramps or twitches  · You have questions or concerns about your condition or care  When should I seek immediate care? Seek care immediately or call 911 if:  · You have a fever  · You feel pain in your abdomen  · You have trouble breathing       · You feel confused, faint, or lose consciousness  · You vomit blood or see blood in your bowel movement  CARE AGREEMENT:   You have the right to help plan your care  Learn about your health condition and how it may be treated  Discuss treatment options with your caregivers to decide what care you want to receive  You always have the right to refuse treatment  The above information is an  only  It is not intended as medical advice for individual conditions or treatments  Talk to your doctor, nurse or pharmacist before following any medical regimen to see if it is safe and effective for you  © 2017 [employer]  Information is for End User's use only and may not be sold, redistributed or otherwise used for commercial purposes  All illustrations and images included in CareNotes® are the copyrighted property of A D A M , Inc  or [employer]  Skin Adhesive Care   WHAT YOU NEED TO KNOW:   Skin adhesive is medical glue used to close wounds  It is a substitute for staples and stitches  Skin adhesive wound closures take less time and do not require anesthesia  You have less pain and a lower risk of infection than with staples or stitches  Skin adhesive will fall off after the wound is healed  DISCHARGE INSTRUCTIONS:   Self-care:   · Keep your wound clean and dry  for 1 to 5 days  You can shower 24 hours after the skin adhesive is applied  Lightly pat your wound dry after you shower  · Do not soak  your wound in water, such as in a bath or hot tub  · Do not scrub  your wound or pick at the adhesive  This can make your wound reopen  · Do not apply ointments  to your wound  These include antibiotic and other ointments that contain petroleum jelly  These products will remove skin adhesive and reopen your wound  Follow up with your healthcare provider as directed:  Write down your questions so you remember to ask them during your visits  Contact your healthcare provider if:   · You have a fever  · Your wound is red and warm to touch  · You have questions or concerns about your condition or care  Seek care immediately if:   · Your wound has fluid draining from it  · Your wound opens  © 2017 [employer] [employer] Information is for End User's use only and may not be sold, redistributed or otherwise used for commercial purposes  All illustrations and images included in CareNotes® are the copyrighted property of A D [employer] M , Inc  or [employer]  The above information is an  only  It is not intended as medical advice for individual conditions or treatments  Talk to your doctor, nurse or pharmacist before following any medical regimen to see if it is safe and effective for you

## 2018-10-16 RX ORDER — FUROSEMIDE 10 MG/ML
20 INJECTION INTRAMUSCULAR; INTRAVENOUS ONCE
Status: DISCONTINUED | OUTPATIENT
Start: 2018-10-17 | End: 2018-10-21 | Stop reason: HOSPADM

## 2018-10-16 RX ORDER — SODIUM CHLORIDE 9 MG/ML
20 INJECTION, SOLUTION INTRAVENOUS CONTINUOUS
Status: DISCONTINUED | OUTPATIENT
Start: 2018-10-17 | End: 2018-10-21 | Stop reason: HOSPADM

## 2018-10-17 ENCOUNTER — HOSPITAL ENCOUNTER (OUTPATIENT)
Dept: INFUSION CENTER | Facility: HOSPITAL | Age: 63
Discharge: HOME | End: 2018-10-17
Payer: COMMERCIAL

## 2018-10-17 VITALS
DIASTOLIC BLOOD PRESSURE: 57 MMHG | SYSTOLIC BLOOD PRESSURE: 100 MMHG | TEMPERATURE: 98.8 F | HEART RATE: 95 BPM | RESPIRATION RATE: 16 BRPM

## 2018-10-17 LAB
ABO GROUP BLD: NORMAL
ABO GROUP BLD: NORMAL
BLD GP AB SCN SERPL QL: NORMAL
RH BLD: POSITIVE
RH BLD: POSITIVE
SPECIMEN EXPIRATION DATE: NORMAL

## 2018-10-17 PROCEDURE — 36430 TRANSFUSION BLD/BLD COMPNT: CPT

## 2018-10-17 PROCEDURE — 86850 RBC ANTIBODY SCREEN: CPT | Performed by: INTERNAL MEDICINE

## 2018-10-17 PROCEDURE — 86900 BLOOD TYPING SEROLOGIC ABO: CPT | Performed by: INTERNAL MEDICINE

## 2018-10-17 PROCEDURE — P9021 RED BLOOD CELLS UNIT: HCPCS

## 2018-10-17 PROCEDURE — 86920 COMPATIBILITY TEST SPIN: CPT

## 2018-10-17 PROCEDURE — 86901 BLOOD TYPING SEROLOGIC RH(D): CPT | Performed by: INTERNAL MEDICINE

## 2018-10-17 RX ORDER — SPIRONOLACTONE 100 MG/1
100 TABLET, FILM COATED ORAL DAILY
COMMUNITY

## 2018-10-17 RX ORDER — FUROSEMIDE 10 MG/ML
INJECTION INTRAMUSCULAR; INTRAVENOUS
Status: DISCONTINUED
Start: 2018-10-17 | End: 2018-10-17 | Stop reason: WASHOUT

## 2018-10-17 RX ORDER — ONDANSETRON HYDROCHLORIDE 8 MG/1
TABLET, FILM COATED ORAL EVERY 8 HOURS PRN
COMMUNITY

## 2018-10-17 RX ORDER — BUMETANIDE 1 MG/1
1 TABLET ORAL DAILY
COMMUNITY

## 2018-10-17 RX ADMIN — SODIUM CHLORIDE 20 ML/HR: 9 INJECTION, SOLUTION INTRAVENOUS at 10:50

## 2018-10-17 NOTE — PROGRESS NOTES
Pt has a right arm dual lumen picc placed at [address] [place] [address]   picc site wnl, Purple port flushes and aspirates freely red port has tape on it "do not use", blood work taken from purple port

## 2018-10-17 NOTE — PROGRESS NOTES
Pt here for 2 units PRBC from [employer], hgb 7 3 last week  Pt asymtomatic at this time   Pt offers no complaints today

## 2018-10-17 NOTE — PROGRESS NOTES
Pt tolerated infusion well  picc flushed per routine and fresh caps applied  Pt refused iv lasix  Reasons for med explained  Pt said "im already taking water pills and its such a struggle to get up, theres no way  Plus I have an hour drive  I dont want it "  pts pulse ox is 97% at present  No cough noted  Pt denies shortness of breath   Awaiting ambulance for transport back to [address] [place] [address]

## 2018-10-24 ENCOUNTER — HOSPITAL ENCOUNTER (OUTPATIENT)
Dept: RADIOLOGY | Facility: HOSPITAL | Age: 63
Discharge: HOME | End: 2018-10-24
Attending: INTERNAL MEDICINE
Payer: COMMERCIAL

## 2018-10-24 VITALS
SYSTOLIC BLOOD PRESSURE: 93 MMHG | HEART RATE: 95 BPM | OXYGEN SATURATION: 95 % | DIASTOLIC BLOOD PRESSURE: 55 MMHG | RESPIRATION RATE: 16 BRPM

## 2018-10-24 DIAGNOSIS — J90 PLEURAL EFFUSION: ICD-10-CM

## 2018-10-24 DIAGNOSIS — R18.8 ASCITES: ICD-10-CM

## 2018-10-24 PROCEDURE — 32555 ASPIRATE PLEURA W/ IMAGING: CPT | Performed by: RADIOLOGY

## 2018-10-24 PROCEDURE — 76705 ECHO EXAM OF ABDOMEN: CPT | Performed by: RADIOLOGY

## 2018-10-24 PROCEDURE — 32555 ASPIRATE PLEURA W/ IMAGING: CPT

## 2018-10-24 NOTE — NURSING NOTE
Patient transported back to [facility] at this time with [facility]  Patient vitals stable upon departure, denies pain  Thoracentesis site dressing remains clean, dry, intact

## 2018-10-24 NOTE — DISCHARGE INSTRUCTIONS
Thoracentesis   WHAT YOU NEED TO KNOW:   A thoracentesis is a procedure to remove extra fluid or air from between your lungs and your inner chest wall  Air or fluid buildup may make it hard for you to breathe  A thoracentesis allows your lungs to expand fully so you can breathe more easily  DISCHARGE INSTRUCTIONS:   Medicines:   · Pain medicine: You may be given a prescription medicine to decrease pain  Do not wait until the pain is severe before you take your medicine  · Antibiotics: This medicine helps fight or prevent an infection  · Take your medicine as directed  Contact your healthcare provider if you think your medicine is not helping or if you have side effects  Tell him or her if you are allergic to any medicine  Keep a list of the medicines, vitamins, and herbs you take  Include the amounts, and when and why you take them  Bring the list or the pill bottles to follow-up visits  Carry your medicine list with you in case of an emergency  Follow up with your healthcare provider as directed:  Write down your questions so you remember to ask them during your visits  Rest:  Rest when you feel it is needed  Slowly start to do more each day  Return to your daily activities as directed  Do not smoke: If you smoke, it is never too late to quit  Ask for information about how to stop smoking if you need help  Contact your healthcare provider if:   · You have a fever  · Your puncture site is red, warm, swollen, or draining pus  · You have questions or concerns about your procedure, medicine, or care  Seek care immediately or call 911 if:   · Blood soaks through your bandage  · There is blood in your spit  · You had a chest tube removed and your stitches come apart  · You have trouble breathing all of a sudden  · You have severe chest pain    © 2017 [employer] Information is for End User's use only and may not be sold, redistributed or otherwise used for commercial purposes  All illustrations and images included in CareNotes® are the copyrighted property of [employer]  or [employer] Haider  The above information is an  only  It is not intended as medical advice for individual conditions or treatments  Talk to your doctor, nurse or pharmacist before following any medical regimen to see if it is safe and effective for you

## 2018-10-24 NOTE — BRIEF OP NOTE (RAD/CATH)
IR PARACENTESIS:  Procedure Note    PATIENT NAME: Litzy Galloway  : 1955  MRN: [id number]     Pre-op Diagnosis:   1  Ascites      Post-op Diagnosis:   1  Ascites        Surgeon:   Niru Cline MD  Assistants:     Estimated Blood Loss:  No procedure done  Findings:  Ultrasound examination showed no significant ascites  Therefore paracentesis was not performed  Specimens:  No procedure done  Complications:  No procedure done  Anesthesia: None      Niru Cline MD     Date: 10/24/2018  Time: 5:03 PM

## 2018-10-24 NOTE — BRIEF OP NOTE (RAD/CATH)
IR THORACENTESIS  Procedure Note    PATIENT NAME: Farheen Braun  : 1955  MRN: [id number]     Pre-op Diagnosis:   1  Pleural effusion      Post-op Diagnosis:   1  Pleural effusion        Surgeon:   Ana Medina MD  Assistants:     Estimated Blood Loss:  None  Findings:  Large left pleural effusion  Specimens:  2 L of dark red fluid  Complications:  None      Anesthesia: Darlene Pillai MD     Date: 10/24/2018  Time: 5:02 PM

## 2018-10-25 ENCOUNTER — TELEPHONE (OUTPATIENT)
Dept: FAMILY MEDICINE CLINIC | Facility: CLINIC | Age: 63
End: 2018-10-25

## 2018-10-30 ENCOUNTER — TELEPHONE (OUTPATIENT)
Dept: FAMILY MEDICINE CLINIC | Facility: CLINIC | Age: 63
End: 2018-10-30

## 2018-10-30 DIAGNOSIS — C79.9 METASTATIC CANCER (HCC): Primary | ICD-10-CM

## 2018-10-30 RX ORDER — MORPHINE SULFATE 20 MG/ML
SOLUTION ORAL
Qty: 240 ML | Refills: 0 | Status: SHIPPED | OUTPATIENT
Start: 2018-10-30

## 2018-10-30 NOTE — TELEPHONE ENCOUNTER
I received a call and spoke with AG with [facility] on 10/30/2018  He called to report that the patient is currently actively dying  He requested that the p r n  Morphine dosing be changed to 10 mg Q hour as needed    I okayed the request

## 2018-10-30 NOTE — TELEPHONE ENCOUNTER
Laly Omalley from hospice would like a printed script for liquid morphine 100mg/5ml dose 0 25ml/5mg q1hr as needed for respiratory distress or severe pain and it has to say hospice on it and be faxed to prof Ronald Mendez

## 2018-10-30 NOTE — TELEPHONE ENCOUNTER
BRIGITTE from [facility], they have decided to cancel PT for Merry Perez because he has deteriorated in the last 2 days and did not feel that it was appropriate at this time      [phone] if you have any questions

## 2020-02-26 NOTE — TELEPHONE ENCOUNTER
Usha Matute from [facility] home care called, patient is being d/c tomorrow from [facility] and will be on hospice  She would like to know if you will follow him on hospice  Usha Matute can be reached at [phone]  00:11